# Patient Record
Sex: MALE
[De-identification: names, ages, dates, MRNs, and addresses within clinical notes are randomized per-mention and may not be internally consistent; named-entity substitution may affect disease eponyms.]

---

## 2017-11-22 ENCOUNTER — HOSPITAL ENCOUNTER (EMERGENCY)
Dept: HOSPITAL 63 - ER | Age: 62
Discharge: HOME | End: 2017-11-22
Payer: OTHER GOVERNMENT

## 2017-11-22 VITALS
DIASTOLIC BLOOD PRESSURE: 55 MMHG | DIASTOLIC BLOOD PRESSURE: 55 MMHG | SYSTOLIC BLOOD PRESSURE: 137 MMHG | DIASTOLIC BLOOD PRESSURE: 55 MMHG | SYSTOLIC BLOOD PRESSURE: 137 MMHG | SYSTOLIC BLOOD PRESSURE: 137 MMHG

## 2017-11-22 VITALS — BODY MASS INDEX: 25.9 KG/M2 | WEIGHT: 185 LBS | HEIGHT: 71 IN

## 2017-11-22 DIAGNOSIS — I10: ICD-10-CM

## 2017-11-22 DIAGNOSIS — M62.838: Primary | ICD-10-CM

## 2017-11-22 DIAGNOSIS — M54.2: ICD-10-CM

## 2017-11-22 DIAGNOSIS — Z86.73: ICD-10-CM

## 2017-11-22 PROCEDURE — 99283 EMERGENCY DEPT VISIT LOW MDM: CPT

## 2017-11-22 PROCEDURE — 96372 THER/PROPH/DIAG INJ SC/IM: CPT

## 2017-11-22 NOTE — PHYS DOC
Past History


Past Medical History:  Hypertension, Stroke


Past Surgical History:  Other


Alcohol Use:  None


Drug Use:  None





Adult General


Chief Complaint


Chief Complaint:  MUSCLE SPASM/CRAMP





HPI


HPI





62-year-old male presenting to the emergency department today with left-sided 

neck muscle spasm over the past 2-3 days. Worse this morning. Worse with 

movement of the neck. He denies any trauma. He denies numbness weakness or 

tingling. He denies vision changes slurred speech or headache.





Review of systems is negative for fevers chills chest pain shortness of breath 

cough. She denies abdominal pain nausea or vomiting. All other review of 

systems is negative unless otherwise noted in history of present illness.





ED course: 62-year-old male presenting to the emergency department today with 

left-sided neck pain consistent with muscle spasm. Upon arrival the patient is 

afebrile with a normal heart rate. He is treated with intramuscular 

hydromorphone and oral pain medications. On reexamination is feeling much 

better. He has a normal neurologic exam. He was subsequent discharged home. The 

patient was then discharged home in stable condition to follow up with their 

primary care physician over the next 2-3 days. They were to return if their 

symptoms worsened or if they were concerned for any reason. Face-to-face 

discharge instructions and return precautions were given. Patient's questions 

were answered to their satisfaction. Patient is comfortable plan.





Review of Systems


Review of Systems


SEE ABOVE.





Current Medications


Current Medications





Current Medications








 Medications


  (Trade)  Dose


 Ordered  Sig/[facility]  Start Time


 Stop Time Status Last Admin


Dose Admin


 


 Cyclobenzaprine


 HCl


  (Flexeril)  10 mg  1X  ONCE  11/22/17 08:00


 11/22/17 08:01  11/22/17 07:48


10 MG


 


 Hydromorphone HCl


  (Dilaudid)  0.5 mg  1X  ONCE  11/22/17 07:30


 11/22/17 07:31 DC 11/22/17 07:12


0.5 MG


 


 Ibuprofen


  (Motrin)  400 mg  1X  ONCE  11/22/17 07:30


 11/22/17 07:31 DC 11/22/17 07:11


400 MG











Allergies


Allergies





Allergies








Coded Allergies Type Severity Reaction Last Updated Verified


 


  No Known Drug Allergies    11/22/17 No











Physical Exam


Physical Exam


SEE ABOVE





Constitutional: Well developed, well nourished, no acute distress, non-toxic 

appearance. []


HENT: Normocephalic, atraumatic, bilateral external ears normal, oropharynx 

moist, no oral exudates, nose normal. []


Eyes: PERRLA, EOMI, conjunctiva normal, no discharge. [] 


Neck: Patient has mild tender to tenderness to palpation of the left neck 

musculature. Nontender midline. Pain with limited range of motion of the neck 

from spasm. Negative Kernig sign.


Cardiovascular:Heart rate regular rhythm, no murmur []


Lungs & Thorax:  Bilateral breath sounds clear to auscultation 


Abdomen: Bowel sounds normal, soft, no tenderness, no masses, no pulsatile 

masses. [] 


Skin: Warm, dry, no erythema, no rash.  


Back: No tenderness, no CVA tenderness. [] 


Extremities: No tenderness, no cyanosis, no clubbing, ROM intact, no edema. [] 


Neurologic: Alert and oriented X 3, normal motor function, normal sensory 

function, no focal deficits noted. 


Psychologic: Affect normal, judgement normal, mood normal. []





Current Patient Data


Vital Signs





 Vital Signs








  Date Time  Temp Pulse Resp B/P (MAP) Pulse Ox O2 Delivery O2 Flow Rate FiO2


 


11/22/17 07:49  51 16 137/55 (82) 97   


 


11/22/17 07:12      Room Air  


 


11/22/17 07:02 98.3       











EKG


EKG


[]





Radiology/Procedures


Radiology/Procedures


[]





Course & Med Decision Making


Course & Med Decision Making


Pertinent Labs and Imaging studies reviewed. (See chart for details)





[]





Dragon Disclaimer


Dragon Disclaimer


This electronic medical record was generated, in whole or in part, using a 

voice recognition dictation system.





Departure


Departure:


Impression:  


 Primary Impression:  


 Muscle spasm


Disposition:  01 HOME, SELF-CARE


Condition:  STABLE


Referrals:  


CORNELIUS CROWELL MD (PCP)


Patient Instructions:  Muscle Strain, Easy-to-Read, Muscle Tear





Additional Instructions:  


Thank you for allowing us to participate in your care today.





Followup with your primary care physician in 3 days if your symptoms do not 

improve.  Call your Primary Doctor tomorrow and inform them of your visit 

today.  If you do not have a primary care provider you can ask for a list of 

our primary care providers. Return to the emergency department you have any new 

or concerning findings.





This should be evaluated by the primary care physician and any necessary 

consulting services for continued management within a few days after discharge. 

Return to emergency room if you have any  new or concerning symptoms including 

but not limited to fever, chills, nausea, vomiting, intractable pain, any new 

rashes, chest pain, shortness of air, uncontrolled bleeding, difficulty 

breathing, and/or vision loss.


Scripts


Ibuprofen (IBUPROFEN) 400 Mg Tablet


1 TAB PO PRN Q8HRS Y for PAIN, #20 TAB


   Prov: ANATOLY HUERTA MD         11/22/17 


Cyclobenzaprine Hcl (CYCLOBENZAPRINE HCL) 5 Mg Tablet


1 TAB PO PRN BID Y for PAIN, #30 TAB


   Prov: ANATOLY HUERTA MD         11/22/17 


Hydrocodone Bit/Acetaminophen (HYDROCODONE-APAP 5-325  **) 1 Each Tablet


1 TAB PO PRN Q6HRS Y for PAIN, #10 TAB 0 Refills


   Prov: ANATOLY HUERTA MD         11/22/17











ANATOLY HURETA MD Nov 22, 2017 07:53

## 2018-12-07 ENCOUNTER — HOSPITAL ENCOUNTER (OUTPATIENT)
Dept: HOSPITAL 61 - SURG | Age: 63
Discharge: HOME | End: 2018-12-07
Attending: INTERNAL MEDICINE
Payer: OTHER GOVERNMENT

## 2018-12-07 VITALS — SYSTOLIC BLOOD PRESSURE: 154 MMHG | DIASTOLIC BLOOD PRESSURE: 78 MMHG

## 2018-12-07 DIAGNOSIS — Z98.890: ICD-10-CM

## 2018-12-07 DIAGNOSIS — K64.0: ICD-10-CM

## 2018-12-07 DIAGNOSIS — J45.909: ICD-10-CM

## 2018-12-07 DIAGNOSIS — Z12.11: Primary | ICD-10-CM

## 2018-12-07 DIAGNOSIS — Z82.49: ICD-10-CM

## 2018-12-07 DIAGNOSIS — I10: ICD-10-CM

## 2018-12-07 DIAGNOSIS — E78.5: ICD-10-CM

## 2018-12-07 PROCEDURE — 45378 DIAGNOSTIC COLONOSCOPY: CPT

## 2018-12-07 NOTE — CONS
DATE OF CONSULTATION:  



REFERRING PHYSICIAN:  Sandra Awad



REASON FOR CONSULTATION:  Colorectal screening.



HISTORY OF PRESENT ILLNESS:  A 63-year-old  male whose past medical

history is significant for hypertension, hyperlipidemia and status post hernia

surgery is seen for interval colon exam.  Exam 10 years ago was unrevealing for

polyps or cancers.  Denies any change in bowel habits, diarrhea or constipation.

 No melena or hematochezia.  Weight and appetite are stable, is otherwise

without additional complaints.



PAST MEDICAL HISTORY:  Hypertension, hyperlipidemia, history of asthma and

status post bilateral hernia repairs.



FAMILY HISTORY:  Significant for organic heart disease and heart attack and

hypertension with his father.



SOCIAL HISTORY:  He is nonsmoker and nondrinker.



REVIEW OF SYSTEMS:  As per records.



PHYSICAL EXAMINATION:

GENERAL:  Reveals a well-nourished, well-developed  male.

VITAL SIGNS:  Temperature is 98, pulse 48, respiratory rate 20.

HEENT:  Reveals normocephalic and atraumatic head.  Pupils and extraocular

muscles are not tested.  Sclerae anicteric.

NECK:  Supple.

LUNGS:  Clear.

CARDIOVASCULAR:  Reveals an S1, S2 without S3, S4 or appreciable murmur.

ABDOMEN:  Reveals soft abdomen, normal bowel sounds without appreciable

hepatosplenomegaly.

EXTREMITIES:  Reveals no cyanosis, clubbing or edema.



IMPRESSION:  Colorectal screening is warranted at this time.  Risks and benefits

of the procedure including risk of hemorrhage and perforation have been

discussed with the patient who is willing to proceed.



I would like to thank Sandra Awad for allowing us to consult and participate in

the patient's care.

 



______________________________

PEACE LOW MD DR:  SSP/nohemi  JOB#:  [id number] / [id number]

DD:  12/07/2018 07:38  DT:  12/07/2018 07:54



Sandra Nguyen

## 2019-08-06 ENCOUNTER — HOSPITAL ENCOUNTER (OUTPATIENT)
Dept: HOSPITAL 63 - US | Age: 64
Discharge: HOME | End: 2019-08-06
Payer: OTHER GOVERNMENT

## 2019-08-06 DIAGNOSIS — I10: ICD-10-CM

## 2019-08-06 DIAGNOSIS — N28.1: Primary | ICD-10-CM

## 2019-08-06 PROCEDURE — 76770 US EXAM ABDO BACK WALL COMP: CPT

## 2019-08-06 NOTE — RAD
MR#: [id number]

Account#: [id number]

Accession#: [id number]

Date of Study: 08/06/2019

Ordering Physician: ANDREA MIRANDA,

Referring Physician: ANDREA MIRANDA,

Tech: Mary Antunez, HUGH, RVT, RTR





--------------- APPROVED REPORT --------------





Patient Location: OUT-PATIENT



Indications

Gray scale images of the bilateral kidneys are grossly unremarkable. normal size. 



Small cyst noted on the left kidney measuring 1.2 x 1.0 x 0.88. 



Non-distended bladder. No obvious masses noted. 



Spectral waveforms in the proximal, mid and distal renal arteries are grossly unremarkable. 



Normal RAR. 



Critical Notification

Critical Value: No



<Conclusion>

No high grade renal artery stenosis. 



Signed by : Andrea Miranda, 

Electronically Approved : 08/06/2019 12:23:04

## 2019-09-10 ENCOUNTER — HOSPITAL ENCOUNTER (OUTPATIENT)
Dept: HOSPITAL 63 - ECHO | Age: 64
Discharge: HOME | End: 2019-09-10
Payer: OTHER GOVERNMENT

## 2019-09-10 DIAGNOSIS — I08.8: Primary | ICD-10-CM

## 2019-09-10 DIAGNOSIS — I10: ICD-10-CM

## 2019-09-10 PROCEDURE — 93306 TTE W/DOPPLER COMPLETE: CPT

## 2019-09-10 NOTE — CARD
MR#: [id number]

Account#: [id number]

Accession#: [id number]

Date of Study: 09/10/2019

Ordering Physician: ANDREA MIRANDA, 

Referring Physician: ANDREA MIRANDA, 

Tech: Elida Lua LUCIANO





--------------- APPROVED REPORT --------------





EXAM: Two-dimensional and M-mode echocardiogram with Doppler and color Doppler.



Other Information 

Quality : Good



INDICATION

Hypertension/HCVD



2D DIMENSIONS 

RVDd3.7 (2.9-3.5cm)Left Atrium(2D)4.0 (1.6-4.0cm)

IVSd1.3 (0.7-1.1cm)Aortic Root(2D)3.0 (2.0-3.7cm)

LVDd5.0 (3.9-5.9cm)LVOT Diameter2.4 (1.8-2.4cm)

PWd1.0 (0.7-1.1cm)LVDs3.2 (2.5-4.0cm)

FS (%) 14.6 %SV35.9 ml

LVEF(%)55.0 (>50%)



Aortic Valve

AoV Peak Aguila.148.5cm/sAoV VTI32.8cm

AO Peak GR.8.8mmHgLVOT Peak Aguila.113.1cm/s

LVOT  VTI 25.13cmAO Mean GR.5mmHg

JOSE (VMAX)3.96fa9UXR   (VTI)3.43cm2



Mitral Valve

MV E Mmqrggqy66.9cm/sMV DECEL UXUX453il

MV A Huebebva07.1cm/sE/A  Ratio0.9



Tricuspid Valve

TR P. Xcsvixqu533av/sRAP BVCGNPJQ7qySw

TR Peak Gr.03xnNdDKXD60ysAe



Pulmonary Vein

S1 Jdwcxuvh93.0cm/sD2 Hanqhtix39.8cm/s



 LEFT VENTRICLE 

The left ventricle is normal size. There is mild concentric left ventricular hypertrophy. The left ve
ntricular systolic function is normal and the ejection fraction is within normal range. The Ejection 
Fraction is 50-55%. There is normal LV segmental wall motion. Transmitral Doppler flow pattern is Gra
de I-abnormal relaxation pattern.



 RIGHT VENTRICLE 

The right ventricle is normal size. The right ventricular systolic function is normal.



 ATRIA 

The left atrium is borderline dilated. The right atrium size is normal. The interatrial septum is int
act with no evidence for an atrial septal defect or patent foramen ovale as noted on 2-D or Doppler i
maging.



 AORTIC VALVE 

The aortic valve is calcified but opens well. Doppler and Color Flow revealed no significant aortic r
egurgitation. There is no significant aortic valvular stenosis.



 MITRAL VALVE 

The mitral valve is calcified but opens well. There is no evidence of mitral valve prolapse. There is
 no mitral valve stenosis. Doppler and Color-flow revealed trace mitral regurgitation.



 TRICUSPID VALVE 

The tricuspid valve is normal in structure and function. Doppler and Color Flow revealed trace tricus
pid regurgitation. The PA pressure was estimated at 25 mmHg. There is no tricuspid valve stenosis.



 PULMONIC VALVE 

The pulmonic valve is not well visualized. Doppler and Color Flow revealed mild pulmonic valvular reg
urgitation. There is no pulmonic valvular stenosis.



 GREAT VESSELS 

The aortic root is normal in size. The ascending aorta is normal in size. The IVC is normal in size a
nd collapses >50% with inspiration.



 PERICARDIAL EFFUSION 

There is no evidence of significant pericardial effusion.



Critical Notification

Critical Value: No



<Conclusion>

The left ventricle is normal size.

The left ventricular systolic function is normal and the ejection fraction is within normal range.

The Ejection Fraction is 50-55%.

There is mild concentric left ventricular hypertrophy.

There is no significant aortic valvular stenosis.

Doppler and Color Flow revealed no significant aortic regurgitation.

Doppler and Color-flow revealed trace mitral regurgitation.

Doppler and Color Flow revealed trace tricuspid regurgitation.

The PA pressure was estimated at 25 mmHg.



Signed by : Yahir Grant MD

Electronically Approved : 09/10/2019 11:41:09

## 2020-07-09 ENCOUNTER — HOSPITAL ENCOUNTER (EMERGENCY)
Dept: HOSPITAL 63 - ER | Age: 65
Discharge: HOME | End: 2020-07-09
Payer: OTHER GOVERNMENT

## 2020-07-09 VITALS
DIASTOLIC BLOOD PRESSURE: 61 MMHG | DIASTOLIC BLOOD PRESSURE: 61 MMHG | SYSTOLIC BLOOD PRESSURE: 131 MMHG | BODY MASS INDEX: 25.41 KG/M2 | HEIGHT: 70 IN | SYSTOLIC BLOOD PRESSURE: 131 MMHG | WEIGHT: 177.47 LBS

## 2020-07-09 DIAGNOSIS — Z86.73: ICD-10-CM

## 2020-07-09 DIAGNOSIS — J06.9: Primary | ICD-10-CM

## 2020-07-09 DIAGNOSIS — I10: ICD-10-CM

## 2020-07-09 DIAGNOSIS — J45.909: ICD-10-CM

## 2020-07-09 PROCEDURE — 71046 X-RAY EXAM CHEST 2 VIEWS: CPT

## 2020-07-09 PROCEDURE — 99283 EMERGENCY DEPT VISIT LOW MDM: CPT

## 2020-07-09 NOTE — RAD
CHEST PA   LATERAL

 

History: Reason: cough / Spl. Instructions:  / History: 

 

Comparison: March 26, 2010

 

Findings:

No consolidation or pleural effusion. Normal heart size. No pneumothorax. 

Calcified left upper lung granuloma, unchanged.

 

Impression: 

1.  No acute cardiopulmonary process.

 

Electronically signed by: Florentino Lim DO (7/9/2020 12:26 PM) [id number]

## 2020-07-09 NOTE — PHYS DOC
Past History


Past Medical History:  Asthma, Hypertension, Stroke


Additional Past Medical Histor:  Patient states asthma is "exercise induced". 

Stoke 3 years ago, no deficits


Past Surgical History:  Other


Smoking:  Non-smoker


Alcohol Use:  Rarely


Drug Use:  None





General Adult


EDM:


Chief Complaint:  COUGH





HPI:


HPI:





64-year-old male presents with report of URI type symptoms including cough, 

congestion, and shortness of air that is been ongoing for the past 10 days.  

Patient does have a history of asthma and seasonal allergies.  Patient reports 

he currently is seeing an allergist and is on desensitization therapy.  Denies 

any fever or chills.  Denies known trauma.  Denies known sick contacts.  Denies 

known exposure to COVID-19.





Patient reports he saw telehealth through [facility] who instructed patient 

to present to the ER for further evaluation.





Review of Systems:


Review of Systems:





Constitutional: Denies fever or chills 


Eyes: Denies redness or eye pain 


HENT: Reports nasal congestion and sore throat


Respiratory: Reports cough and shortness of breath 


Cardiovascular: Denies chest pain or palpitations


GI: Denies abdominal pain, nausea, or vomiting


: Denies dysuria or hematuria


Musculoskeletal: Denies back pain or joint pain


Integument: Denies rash or skin lesions 


Neurologic: Denies headache, focal weakness or sensory changes





Complete systems were reviewed and found to be within normal limits, except as 

documented in this note.





Current Medications:


Current Meds:





Current Medications








 Medications


  (Trade)  Dose


 Ordered  Sig/[facility]  Start Time


 Stop Time Status Last Admin


Dose Admin


 


 Dexamethasone


  (Decadron)  10 mg  1X  ONCE  7/9/20 12:00


 7/9/20 12:01 OK  














Allergies:


Allergies:





Allergies








Coded Allergies Type Severity Reaction Last Updated Verified


 


  No Known Drug Allergies    11/22/17 No











Physical Exam:


PE:





Constitutional: Well developed, well nourished, no acute distress, non-toxic 

appearance


HENT: Normocephalic, atraumatic, nasal turbinates enlarged, pharynx erythematous

without exudates- likely post-nasal drip


Eyes: Conjunctiva normal, no discharge


Neck: Normal range of motion, no tenderness, supple


Cardiovascular: Heart rate normal, regular rhythm


Lungs & Thorax:  Bilateral breath sounds clear to auscultation, no wheezing


Skin: Warm, dry, no erythema, no rash


Extremities: No tenderness, ROM intact, no edema


Neurologic: Alert and oriented X 3, no focal deficits noted


Psychologic: Affect normal, judgment normal





Current Patient Data:


Vital Signs:





                                   Vital Signs








  Date Time  Temp Pulse Resp B/P (MAP) Pulse Ox O2 Delivery O2 Flow Rate FiO2


 


7/9/20 11:44 98.0 61 16 131/61 (84) 97 Room Air  











EKG:


EKG:


[]





Radiology/Procedures:


Radiology/Procedures:


PROCEDURE: CHEST PA & LATERAL





CHEST PA   LATERAL


 


History: Reason: cough / Spl. Instructions:  / History: 


 


Comparison: March 26, 2010


 


Findings:


No consolidation or pleural effusion. Normal heart size. No pneumothorax. 


Calcified left upper lung granuloma, unchanged.


 


Impression: 


1.  No acute cardiopulmonary process.


 


Electronically signed by: Florentino Lim DO (7/9/2020 12:26 PM) [id number]





Course & Med Decision Making:


Course & Med Decision Making


Pertinent Imaging studies reviewed. (See chart for details)





Patient presents with HPI and physical exam consistent for URI versus seasonal 

allergies.  Patient is afebrile with normal sats.  Denies known exposure to 

COVID-19.  Patient without other symptoms.





Chest x-ray without acute process.





Patient stable for discharge with outpatient follow-up with PCP. Discussed 

findings and plan with patient, who acknowledges understanding and agreement.





Dragon Disclaimer:


Dragon Disclaimer:


This electronic medical record was generated, in whole or in part, using a voice

 recognition dictation system.





Departure


Departure:


Impression:  


   Primary Impression:  


   Upper respiratory infection


   Qualified Codes:  J06.9 - Acute upper respiratory infection, unspecified


   Additional Impression:  


   History of asthma


Disposition:  01 HOME/RESIDENCE PRIOR TO ADM


Condition:  STABLE


Referrals:  


IDRIS SOLANO DO (PCP)


Patient Instructions:  Allergies, Generic, Upper Respiratory Infection, Adult, 

Easy-to-Read


Scripts


Prednisone (PREDNISONE) 20 Mg Tablet


2 TAB PO DAILY for URI, #8 TAB


   Start this prescription tomorrow, Friday 7/10/20


   Prov: MOSES SPRING DO         7/9/20





Justification of Admission:


Justification of Admission:


Justification of Admission Dx:  N/A











MOSES SPRING DO              Jul 9, 2020 12:08

## 2021-12-02 ENCOUNTER — HOSPITAL ENCOUNTER (OUTPATIENT)
Dept: HOSPITAL 63 - LAB | Age: 66
End: 2021-12-02
Payer: MEDICARE

## 2021-12-02 DIAGNOSIS — I63.9: Primary | ICD-10-CM

## 2021-12-02 LAB
ALBUMIN SERPL-MCNC: 4.2 G/DL (ref 3.4–5)
ALBUMIN/GLOB SERPL: 1.4 {RATIO} (ref 1–1.7)
ALP SERPL-CCNC: 56 U/L (ref 46–116)
ALT SERPL-CCNC: 31 U/L (ref 16–63)
ANION GAP SERPL CALC-SCNC: 8 MMOL/L (ref 6–14)
AST SERPL-CCNC: 28 U/L (ref 15–37)
BILIRUB SERPL-MCNC: 0.6 MG/DL (ref 0.2–1)
BUN/CREAT SERPL: 16 (ref 6–20)
CA-I SERPL ISE-MCNC: 13 MG/DL (ref 8–26)
CALCIUM SERPL-MCNC: 9.2 MG/DL (ref 8.5–10.1)
CHLORIDE SERPL-SCNC: 105 MMOL/L (ref 98–107)
CO2 SERPL-SCNC: 26 MMOL/L (ref 21–32)
CREAT SERPL-MCNC: 0.8 MG/DL (ref 0.7–1.3)
GFR SERPLBLD BASED ON 1.73 SQ M-ARVRAT: 96.7 ML/MIN
GLOBULIN SER-MCNC: 2.9 G/DL (ref 2.2–3.8)
GLUCOSE SERPL-MCNC: 91 MG/DL (ref 70–99)
POTASSIUM SERPL-SCNC: 3.8 MMOL/L (ref 3.5–5.1)
PROT SERPL-MCNC: 7.1 G/DL (ref 6.4–8.2)
SODIUM SERPL-SCNC: 139 MMOL/L (ref 136–145)

## 2021-12-02 PROCEDURE — 80061 LIPID PANEL: CPT

## 2021-12-02 PROCEDURE — 36415 COLL VENOUS BLD VENIPUNCTURE: CPT

## 2021-12-02 PROCEDURE — 80053 COMPREHEN METABOLIC PANEL: CPT

## 2021-12-03 LAB
CHOLEST/HDLC SERPL: 2 {RATIO}
HDLC SERPL-MCNC: 54 MG/DL (ref 40–60)
LDLC: 89 MG/DL (ref 0–100)
TRIGL SERPL-MCNC: 65 MG/DL (ref 0–150)
VLDLC: 13 MG/DL (ref 0–40)